# Patient Record
Sex: FEMALE | Race: WHITE | HISPANIC OR LATINO | URBAN - METROPOLITAN AREA
[De-identification: names, ages, dates, MRNs, and addresses within clinical notes are randomized per-mention and may not be internally consistent; named-entity substitution may affect disease eponyms.]

---

## 2018-06-13 ENCOUNTER — PRE-OPERATIVE VISIT (OUTPATIENT)
Dept: URBAN - METROPOLITAN AREA CLINIC 11 | Facility: CLINIC | Age: 63
End: 2018-06-13
Payer: MEDICARE

## 2018-06-13 DIAGNOSIS — H25.13 AGE-RELATED NUCLEAR CATARACT, BILATERAL: Primary | ICD-10-CM

## 2018-06-13 PROCEDURE — 76519 ECHO EXAM OF EYE: CPT | Performed by: OPHTHALMOLOGY

## 2018-06-13 ASSESSMENT — PACHYMETRY
OS: 3.45
OD: 3.11
OD: 23.56
OS: 23.99

## 2018-06-20 ENCOUNTER — OFFICE VISIT (OUTPATIENT)
Dept: URBAN - METROPOLITAN AREA CLINIC 11 | Facility: CLINIC | Age: 63
End: 2018-06-20
Payer: MEDICARE

## 2018-06-20 DIAGNOSIS — H25.21 AGE-RELATED HYPERMATURE CATARACT OF RIGHT EYE: Primary | ICD-10-CM

## 2018-06-20 DIAGNOSIS — H25.12 AGE-RELATED NUCLEAR CATARACT, LEFT EYE: ICD-10-CM

## 2018-06-20 DIAGNOSIS — E11.3513 TYPE 2 DIABETES MELLITUS W/ PROLIFERATIVE DIABETIC RETINOPATHY W/ MACULAR EDEMA, BILATERAL: ICD-10-CM

## 2018-06-20 PROCEDURE — 76519 ECHO EXAM OF EYE: CPT | Performed by: OPHTHALMOLOGY

## 2018-06-20 PROCEDURE — 99214 OFFICE O/P EST MOD 30 MIN: CPT | Performed by: OPHTHALMOLOGY

## 2018-06-20 ASSESSMENT — INTRAOCULAR PRESSURE
OS: 18
OD: 20

## 2018-06-20 NOTE — IMPRESSION/PLAN
Impression: Type 2 diabetes mellitus w/ proliferative diabetic retinopathy w/ macular edema, bilateral: N78.2096 OD. Plan: continue care with [facility].

## 2018-06-20 NOTE — IMPRESSION/PLAN
Impression: Age-related nuclear cataract, left eye: H25.12. Plan: Discussed diagnosis in detail with patient. Will continue to observe condition and or symptoms. revalaute after healed from [employer] if patient becomes symptomatic.

## 2018-06-20 NOTE — IMPRESSION/PLAN
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 Plan: OK for ce/iol OD in [address], RL2. Distance target. Discussed diagnosis of cataracts. Cataracts are limiting vision. Discussed risks, benefits and alternatives to surgery including but not limited to: bleeding, infection, risk of vision loss, loss of the eye, need for other surgery. Patient voiced understanding and wishes to proceed. Guarded visual prognosis due to poor view.

## 2018-08-20 ENCOUNTER — SURGERY (OUTPATIENT)
Dept: URBAN - METROPOLITAN AREA SURGERY 15 | Facility: SURGERY | Age: 63
End: 2018-08-20
Payer: MEDICARE

## 2018-08-20 PROCEDURE — 66982 XCAPSL CTRC RMVL CPLX WO ECP: CPT | Performed by: OPHTHALMOLOGY

## 2018-08-21 ENCOUNTER — POST-OPERATIVE VISIT (OUTPATIENT)
Dept: URBAN - METROPOLITAN AREA CLINIC 11 | Facility: CLINIC | Age: 63
End: 2018-08-21

## 2018-08-21 PROCEDURE — 99024 POSTOP FOLLOW-UP VISIT: CPT | Performed by: OPTOMETRIST

## 2018-08-21 ASSESSMENT — INTRAOCULAR PRESSURE
OS: 19
OD: 18

## 2018-08-28 ENCOUNTER — POST-OPERATIVE VISIT (OUTPATIENT)
Dept: URBAN - METROPOLITAN AREA CLINIC 11 | Facility: CLINIC | Age: 63
End: 2018-08-28

## 2018-08-28 PROCEDURE — 99024 POSTOP FOLLOW-UP VISIT: CPT | Performed by: OPTOMETRIST

## 2018-08-28 ASSESSMENT — INTRAOCULAR PRESSURE
OS: 13
OD: 16

## 2018-09-27 ENCOUNTER — POST-OPERATIVE VISIT (OUTPATIENT)
Dept: URBAN - METROPOLITAN AREA CLINIC 11 | Facility: CLINIC | Age: 63
End: 2018-09-27

## 2018-09-27 DIAGNOSIS — Z09 ENCNTR FOR F/U EXAM AFT TRTMT FOR COND OTH THAN MALIG NEOPLM: Primary | ICD-10-CM

## 2018-09-27 PROCEDURE — 99024 POSTOP FOLLOW-UP VISIT: CPT | Performed by: OPTOMETRIST

## 2018-09-27 ASSESSMENT — INTRAOCULAR PRESSURE
OD: 14
OS: 14

## 2019-02-11 ENCOUNTER — OFFICE VISIT (OUTPATIENT)
Dept: URBAN - METROPOLITAN AREA CLINIC 11 | Facility: CLINIC | Age: 64
End: 2019-02-11
Payer: MEDICARE

## 2019-02-11 DIAGNOSIS — E11.3593 TYPE 2 DIABETES MELLITUS W/ PROLIFERATIVE DIABETIC RETINOPATHY W/O MACULAR EDEMA, BILATERAL: Primary | ICD-10-CM

## 2019-02-11 DIAGNOSIS — H26.491 OTHER SECONDARY CATARACT, RIGHT EYE: ICD-10-CM

## 2019-02-11 PROCEDURE — 92134 CPTRZ OPH DX IMG PST SGM RTA: CPT | Performed by: OPTOMETRIST

## 2019-02-11 PROCEDURE — 92014 COMPRE OPH EXAM EST PT 1/>: CPT | Performed by: OPTOMETRIST

## 2019-02-11 ASSESSMENT — VISUAL ACUITY
OD: 20/400
OS: 20/400

## 2019-02-11 ASSESSMENT — KERATOMETRY
OS: 43.88
OD: 44.50

## 2019-02-11 ASSESSMENT — INTRAOCULAR PRESSURE
OD: 18
OS: 18

## 2019-02-11 NOTE — IMPRESSION/PLAN
Impression: Other secondary cataract, right eye: H26.491. Plan: Recommend YAG cap OD.  Refer to MD for YAG eval.

## 2019-02-11 NOTE — IMPRESSION/PLAN
Impression: Combined forms of age-related cataract, left eye: H25.812. Plan: Recommend cataract surgery OS.  Refer to MD for Cat liban boykin.

## 2019-02-11 NOTE — IMPRESSION/PLAN
Impression: Type 2 diabetes mellitus w/ proliferative diabetic retinopathy w/o macular edema, bilateral: U65.7631. Plan: OCT of macula ordered and performed today ou. No DME OU. No signs of new neovascularization noted. No treatment necessary at this time. Patient was instructed to monitor vision for sudden changes and to call if visual changes noted. Discussed ocular and systemic benefits of blood sugar control.  RTC 6mns DE

## 2019-03-05 ENCOUNTER — OFFICE VISIT (OUTPATIENT)
Dept: URBAN - METROPOLITAN AREA CLINIC 11 | Facility: CLINIC | Age: 64
End: 2019-03-05
Payer: MEDICARE

## 2019-03-05 DIAGNOSIS — H25.812 COMBINED FORMS OF AGE-RELATED CATARACT, LEFT EYE: Primary | ICD-10-CM

## 2019-03-05 DIAGNOSIS — H43.12 VITREOUS HEMORRHAGE, LEFT EYE: ICD-10-CM

## 2019-03-05 PROCEDURE — 99214 OFFICE O/P EST MOD 30 MIN: CPT | Performed by: OPHTHALMOLOGY

## 2019-03-05 RX ORDER — NEPAFENAC 3 MG/ML
0.3 % SUSPENSION OPHTHALMIC
Qty: 1 | Refills: 1 | Status: ACTIVE
Start: 2019-03-05

## 2019-03-05 RX ORDER — DUREZOL 0.5 MG/ML
0.05 % EMULSION OPHTHALMIC
Qty: 1 | Refills: 1 | Status: ACTIVE
Start: 2019-03-05

## 2019-03-05 RX ORDER — OFLOXACIN 3 MG/ML
0.3 % SOLUTION/ DROPS OPHTHALMIC
Qty: 10 | Refills: 0 | Status: ACTIVE
Start: 2019-03-05

## 2019-03-05 ASSESSMENT — INTRAOCULAR PRESSURE
OD: 18
OS: 16

## 2019-03-05 NOTE — IMPRESSION/PLAN
Impression: Vitreous hemorrhage, left eye: H43.12. ? poor view Plan: Discussed diagnosis in detail with patient. Advised patient of condition.  Consult recommended [[facility]] after ce/iol

## 2019-03-05 NOTE — IMPRESSION/PLAN
Impression: Other secondary cataract, right eye: H26.491. Plan: Discussed diagnosis with patient. Discussed risk/benefit/alternative to yag-capsulotomy, including risk of IOL dislocation and retinal detachment, pt wishes to proceed. ok for yag-cap OD in [facility] RL1.

## 2019-03-05 NOTE — IMPRESSION/PLAN
Impression: Combined forms of age-related cataract, left eye: H25.812. Plan: OK for ce/iol OS in [address], RL2. Distance target. Discussed diagnosis of cataracts. Cataracts are limiting vision. Discussed risks, benefits and alternatives to surgery including but not limited to: bleeding, infection, risk of vision loss, loss of the eye, need for other surgery. Patient voiced understanding and wishes to proceed.

## 2019-03-19 ENCOUNTER — PRE-OPERATIVE VISIT (OUTPATIENT)
Dept: URBAN - METROPOLITAN AREA CLINIC 11 | Facility: CLINIC | Age: 64
End: 2019-03-19
Payer: MEDICARE

## 2019-03-19 PROCEDURE — 92136 OPHTHALMIC BIOMETRY: CPT | Performed by: OPHTHALMOLOGY

## 2019-03-19 ASSESSMENT — PACHYMETRY
OS: 3.51
OS: 23.90
OD: 23.45
OD: 4.48

## 2019-04-01 ENCOUNTER — SURGERY (OUTPATIENT)
Dept: URBAN - METROPOLITAN AREA SURGERY 15 | Facility: SURGERY | Age: 64
End: 2019-04-01
Payer: MEDICARE

## 2019-04-01 PROCEDURE — 66821 AFTER CATARACT LASER SURGERY: CPT | Performed by: OPHTHALMOLOGY

## 2019-04-11 ENCOUNTER — POST-OPERATIVE VISIT (OUTPATIENT)
Dept: URBAN - METROPOLITAN AREA CLINIC 11 | Facility: CLINIC | Age: 64
End: 2019-04-11

## 2019-04-11 PROCEDURE — 99024 POSTOP FOLLOW-UP VISIT: CPT | Performed by: OPTOMETRIST

## 2019-04-11 ASSESSMENT — INTRAOCULAR PRESSURE
OS: 14
OD: 16

## 2019-04-15 ENCOUNTER — SURGERY (OUTPATIENT)
Dept: URBAN - METROPOLITAN AREA SURGERY 15 | Facility: SURGERY | Age: 64
End: 2019-04-15
Payer: MEDICARE

## 2019-04-15 PROCEDURE — 66984 XCAPSL CTRC RMVL W/O ECP: CPT | Performed by: OPHTHALMOLOGY

## 2019-04-16 ENCOUNTER — POST-OPERATIVE VISIT (OUTPATIENT)
Dept: URBAN - METROPOLITAN AREA CLINIC 11 | Facility: CLINIC | Age: 64
End: 2019-04-16

## 2019-04-16 PROCEDURE — 99024 POSTOP FOLLOW-UP VISIT: CPT | Performed by: OPTOMETRIST

## 2019-04-16 ASSESSMENT — INTRAOCULAR PRESSURE
OS: 17
OD: 17

## 2019-04-23 ENCOUNTER — POST-OPERATIVE VISIT (OUTPATIENT)
Dept: URBAN - METROPOLITAN AREA CLINIC 11 | Facility: CLINIC | Age: 64
End: 2019-04-23

## 2019-04-23 PROCEDURE — 99024 POSTOP FOLLOW-UP VISIT: CPT | Performed by: OPTOMETRIST

## 2019-04-23 ASSESSMENT — INTRAOCULAR PRESSURE
OD: 17
OS: 14

## 2019-05-23 ENCOUNTER — POST-OPERATIVE VISIT (OUTPATIENT)
Dept: URBAN - METROPOLITAN AREA CLINIC 11 | Facility: CLINIC | Age: 64
End: 2019-05-23
Payer: MEDICARE

## 2019-05-23 PROCEDURE — 99024 POSTOP FOLLOW-UP VISIT: CPT | Performed by: OPTOMETRIST

## 2019-05-23 ASSESSMENT — INTRAOCULAR PRESSURE
OD: 13
OS: 11

## 2019-05-23 ASSESSMENT — VISUAL ACUITY
OD: 20/150
OS: 20/40

## 2019-06-13 ENCOUNTER — OFFICE VISIT (OUTPATIENT)
Dept: URBAN - METROPOLITAN AREA CLINIC 11 | Facility: CLINIC | Age: 64
End: 2019-06-13
Payer: MEDICARE

## 2019-06-13 PROCEDURE — 92134 CPTRZ OPH DX IMG PST SGM RTA: CPT | Performed by: OPHTHALMOLOGY

## 2019-06-13 PROCEDURE — 99213 OFFICE O/P EST LOW 20 MIN: CPT | Performed by: OPHTHALMOLOGY

## 2019-06-13 ASSESSMENT — INTRAOCULAR PRESSURE
OD: 18
OS: 13

## 2019-06-13 NOTE — IMPRESSION/PLAN
Impression: Type 2 diabetes mellitus w/ proliferative diabetic retinopathy w/o macular edema, right eye: e11.3591. OD.
s/p PRP Plan: OCT ordered and performed today. The clinical exam is consistent with proliferative diabetic retinopathy. Discussed diagnosis with patient. Recommend close observation at this time. Discussed risk of progression with the present condition. The patient was advised to maintain tight blood sugar control, blood pressure and lipid control. Recommend to schedule with Dr. Dot Carson for best MRx. Patient agrees with plan.

## 2019-06-13 NOTE — IMPRESSION/PLAN
Impression: Vitreous hemorrhage, left eye: H43.12. OS. Plan: OCT ordered and performed today. Discussed diagnosis with patient. The clinical exam is consistent with Vitreous Hemorrhage. I have reviewed treatment options with the patient including observation vs Vitrectomy sx. If the hemorrhage fails to spontaneously resolve or worsens, we will revisit the possibility of Vitrectomy at the follow up visit. Patient agrees with plan. Patient to call if vision worsens.

## 2019-07-11 ENCOUNTER — TESTING ONLY (OUTPATIENT)
Dept: URBAN - METROPOLITAN AREA CLINIC 11 | Facility: CLINIC | Age: 64
End: 2019-07-11

## 2019-07-11 DIAGNOSIS — H52.4 PRESBYOPIA: Primary | ICD-10-CM

## 2019-07-11 ASSESSMENT — VISUAL ACUITY
OS: 20/50
OD: 20/200

## 2019-09-12 ENCOUNTER — OFFICE VISIT (OUTPATIENT)
Dept: URBAN - METROPOLITAN AREA CLINIC 11 | Facility: CLINIC | Age: 64
End: 2019-09-12
Payer: MEDICARE

## 2019-09-12 DIAGNOSIS — E11.3492 TYPE 2 DIAB W SEVERE NONPRLF DIAB RTNOP W/O MACULAR EDEMA, LEFT EYE: ICD-10-CM

## 2019-09-12 DIAGNOSIS — E11.3591 TYPE 2 DIAB WITH PROLIF DIAB RTNOP WITHOUT MCLR EDEMA, R EYE: Primary | ICD-10-CM

## 2019-09-12 PROCEDURE — 99214 OFFICE O/P EST MOD 30 MIN: CPT | Performed by: OPHTHALMOLOGY

## 2019-09-12 PROCEDURE — 92134 CPTRZ OPH DX IMG PST SGM RTA: CPT | Performed by: OPHTHALMOLOGY

## 2019-09-12 ASSESSMENT — INTRAOCULAR PRESSURE
OD: 20
OS: 18

## 2019-09-12 NOTE — IMPRESSION/PLAN
Impression: Type 2 diab w severe nonprlf diab rtnop w/o macular edema, left eye: F99.5660. OS. Plan: OCT ordered and performed today. Discussed diagnosis with patient. The clinical exam was consistent with Type 2 diabetes. The patient was advised to maintain tight blood sugar control, blood pressure and lipid control. Recommend patient follow up in 3-4 months with [facility]. Patient was also advised to keep all appointments with PCP for diabetic evaluation and counseling to avoid the systemic complications of diabetes.

## 2019-09-12 NOTE — IMPRESSION/PLAN
Impression: Type 2 diabetes mellitus w/ proliferative diabetic retinopathy w/o macular edema, right eye: e11.3591. OD.
s/p PRP Plan: OCT ordered and performed today. The clinical exam is consistent with proliferative diabetic retinopathy. Discussed diagnosis with patient. Recommend close observation at this time. Discussed risk of progression with the present condition. The patient was advised to maintain tight blood sugar control, blood pressure and lipid control.